# Patient Record
(demographics unavailable — no encounter records)

---

## 2025-05-14 NOTE — REVIEW OF SYSTEMS
[Anxiety] : anxiety [Fever] : no fever [Chills] : no chills [Vision Problems] : no vision problems [Earache] : no earache [Nasal Discharge] : no nasal discharge [Sore Throat] : no sore throat [Chest Pain] : no chest pain [Palpitations] : no palpitations [Shortness Of Breath] : no shortness of breath [Cough] : no cough [Abdominal Pain] : no abdominal pain [Nausea] : no nausea [Constipation] : no constipation [Diarrhea] : no diarrhea [Vomiting] : no vomiting [Dysuria] : no dysuria [Frequency] : no frequency [Itching] : no itching [Mole Changes] : no mole changes [Skin Rash] : no skin rash [Headache] : no headache [Dizziness] : no dizziness

## 2025-05-14 NOTE — HEALTH RISK ASSESSMENT
[Good] : ~his/her~ current health as good [Fair] :  ~his/her~ mood as fair [Yes] : Yes [Monthly or less (1 pt)] : Monthly or less (1 point) [1 or 2 (0 pts)] : 1 or 2 (0 points) [Never (0 pts)] : Never (0 points) [No] : In the past 12 months have you used drugs other than those required for medical reasons? No [Patient reported colonoscopy was normal] : Patient reported colonoscopy was normal [With Significant Other] : lives with significant other [Employed] : employed [Significant Other] : lives with significant other [Smoke Detector] : smoke detector [Carbon Monoxide Detector] : carbon monoxide detector [Seat Belt] :  uses seat belt [Sunscreen] : uses sunscreen [Never] : Never [2] : 2) Feeling down, depressed, or hopeless for more than half of the days (2) [PHQ-2 Positive] : PHQ-2 Positive [Several Days (1)] : 5.) Poor appetite or overeating? Several days [1/2 of Days or More (2)] : 7.) Trouble concentrating on things, such as reading a newspaper or watching television? Half the days or more [Nearly Every Day (3)] : 8.) Moving or speaking so slowly that other people could have noticed, or the opposite, moving or speaking faster than usual? Nearly every day [Not at All (0)] : 9.) Thoughts that you would be off dead or of hurting yourself in some way? Not at all [Moderately Severe] : Severity of Depression is Moderately Severe [Very Difficult] : How difficult have these problems made it for you to do your work, take care of things at home, or get along with people? Very difficult [Audit-CScore] : 1 [de-identified] : walking dogs, sometimes the gym  [de-identified] : balanced diet, not consistent  [[id number]] : 4 [[id number]] : 16 [Reports changes in hearing] : Reports no changes in hearing [Reports changes in vision] : Reports no changes in vision [Reports changes in dental health] : Reports no changes in dental health [ColonoscopyDate] : 08/24 [FreeTextEntry2] : [place]

## 2025-05-14 NOTE — HISTORY OF PRESENT ILLNESS
[FreeTextEntry1] : CPE, establish care [de-identified] : 29yo male who presents to the office for annual physical examination and to establish care.  Previously followed with PMD Dr Beck who left office.   He had followed up with colorectal surgery Dr. Beckwith with concerns of lower abdominal pain. He underwent CT scan which was normal then colonoscopy which showed diverticulosis. He reports he has been limiting gluten and has felt better since doing so.   Reports history of anxiety and depression. He did mention this a few years ago with his previous PMD. He saw a therapist for one session a few years ago and did not find this helped. He was not ready for medications at that time, but feels ready now.  He reports that since he started his new position at the [employer] as a , he has felt distant and not willing to engage with people. Endorses lots of social anxiety.  Reports depression symptoms go up and down. Reports being affected by the weather.

## 2025-05-14 NOTE — ASSESSMENT
[Vaccines Reviewed] : Immunizations reviewed today. Please see immunization details in the vaccine log within the immunization flowsheet.  [FreeTextEntry1] : #HCM - Patient presenting for annual physical exam - Declines flu vaccine today - Will check routine blood work today and discuss results with patient   #Lower abdominal pain - Seen by colorectal surgeon and underwent CT abdomen and colonoscopy revealing diverticulosis - Reports improved symptoms since avoiding gluten products - Requests testing, will check transglutaminase today  #Anxiety and depression - Reports mixed anxiety and depressive symptoms for several years, worsening - Associated with social anxiety, apathy  - Denies manic symptoms  - Interested in starting medications, start Lexapro 5mg  - Discussed that maximum therapeutic effect may take 4-6 weeks  - Follow up in 6 weeks to monitor for improvement and for possible dose adjustment if needed  - Not interested in therapy at this time, will revisit in future

## 2025-06-25 NOTE — REVIEW OF SYSTEMS
[Chest Pain] : no chest pain [Palpitations] : palpitations [Shortness Of Breath] : no shortness of breath [Abdominal Pain] : no abdominal pain [Nausea] : no nausea [Vomiting] : no vomiting [Headache] : no headache [Dizziness] : no dizziness

## 2025-06-25 NOTE — HISTORY OF PRESENT ILLNESS
[Home] : at home, [unfilled] , at the time of the visit. [Medical Office: ([facility])___] : at the medical office located in  [Telehealth (audio & video)] : This visit was provided via telehealth using real-time 2-way audio visual technology. [Verbal consent obtained from patient] : the patient, [unfilled] [FreeTextEntry1] : Follow up [de-identified] : 31yo male with PMH of anxiety and depression presenting for virtual 6 week follow up.  Patient established in office for CPE when he shared concerns of increased anxiety and depression. Was started on Lexapro and asked to follow up to monitor improvement.  He reports that he is feeling some improvement since starting the Lexapro, feels like his depression is improved.  He is open to increasing dose of the medication.

## 2025-06-25 NOTE — ASSESSMENT
[FreeTextEntry1] : #Anxiety and depression - Presenting for 6 week follow up - Slight improvement since starting Lexapro - Increase to 10mg - F/u in 6 weeks for dose adjustment if needed